# Patient Record
Sex: MALE | Race: WHITE | Employment: PART TIME | URBAN - METROPOLITAN AREA
[De-identification: names, ages, dates, MRNs, and addresses within clinical notes are randomized per-mention and may not be internally consistent; named-entity substitution may affect disease eponyms.]

---

## 2023-09-19 ENCOUNTER — OFFICE VISIT (OUTPATIENT)
Dept: URGENT CARE | Facility: URGENT CARE | Age: 28
End: 2023-09-19
Payer: COMMERCIAL

## 2023-09-19 VITALS
DIASTOLIC BLOOD PRESSURE: 82 MMHG | BODY MASS INDEX: 22.44 KG/M2 | WEIGHT: 143 LBS | TEMPERATURE: 97.7 F | HEIGHT: 67 IN | SYSTOLIC BLOOD PRESSURE: 148 MMHG | HEART RATE: 105 BPM | OXYGEN SATURATION: 100 %

## 2023-09-19 DIAGNOSIS — N48.89 PENILE PAIN: Primary | ICD-10-CM

## 2023-09-19 DIAGNOSIS — R21 RASH OF PENIS: ICD-10-CM

## 2023-09-19 LAB
ALBUMIN UR-MCNC: NEGATIVE MG/DL
APPEARANCE UR: CLEAR
BILIRUB UR QL STRIP: NEGATIVE
COLOR UR AUTO: YELLOW
GLUCOSE UR STRIP-MCNC: NEGATIVE MG/DL
HGB UR QL STRIP: NEGATIVE
KETONES UR STRIP-MCNC: NEGATIVE MG/DL
LEUKOCYTE ESTERASE UR QL STRIP: NEGATIVE
NITRATE UR QL: NEGATIVE
PH UR STRIP: 6.5 [PH] (ref 5–7)
SP GR UR STRIP: 1.01 (ref 1–1.03)
UROBILINOGEN UR STRIP-ACNC: 0.2 E.U./DL

## 2023-09-19 PROCEDURE — 81003 URINALYSIS AUTO W/O SCOPE: CPT | Performed by: FAMILY MEDICINE

## 2023-09-19 PROCEDURE — 99203 OFFICE O/P NEW LOW 30 MIN: CPT | Performed by: FAMILY MEDICINE

## 2023-09-19 RX ORDER — CLOTRIMAZOLE 1 %
CREAM (GRAM) TOPICAL 2 TIMES DAILY
Qty: 15 G | Refills: 0 | Status: SHIPPED | OUTPATIENT
Start: 2023-09-19

## 2023-09-20 NOTE — PROGRESS NOTES
"Assessment & Plan     Penile pain  Rash of penis   Possibly yeast v friction v contact derm. Might be worsened due to tighter frenulum inferiorly  - clotrimazole (LOTRIMIN) 1 % external cream  Dispense: 15 g; Refill: 0       [id number]}    Return in about 2 weeks (around 10/3/2023) for follow up with your regular clinic if needed.    Greg Enamorado MD  [facility]    ------------------------------------------------------------------------  Subjective     Edilson Lamb presents to clinic today for the following health issues:  chief complaint  HPI  This summer he has had some irritation on the ventral glans of the penis. It improved then recured about a week ago. Small amount of white dbris noted differe tn than his usual smegma. No sti risk ad not sexually active.       Review of Systems        Objective    BP (!) 148/82   Pulse 105   Temp 97.7  F (36.5  C) (Temporal)   Ht 1.689 m (5' 6.5\")   Wt 64.9 kg (143 lb)   SpO2 100%   BMI 22.74 kg/m    Physical Exam   GENERAL: healthy, alert and no distress   (male): uncircumcised male phallus with mild irritation ventrally. The ventral frenulum of the penis seems abnormally tight. No discharge noted.   "

## 2023-09-20 NOTE — PATIENT INSTRUCTIONS
If the antiyeast medication woods snot work then you could try over the counter hydrocortisone ointment/cream    If neither work then follow up with your doctor.

## 2024-01-23 ENCOUNTER — OFFICE VISIT (OUTPATIENT)
Dept: URGENT CARE | Facility: URGENT CARE | Age: 29
End: 2024-01-23
Payer: COMMERCIAL

## 2024-01-23 VITALS
TEMPERATURE: 98.2 F | HEART RATE: 112 BPM | HEIGHT: 67 IN | OXYGEN SATURATION: 99 % | DIASTOLIC BLOOD PRESSURE: 83 MMHG | SYSTOLIC BLOOD PRESSURE: 142 MMHG | WEIGHT: 145 LBS | BODY MASS INDEX: 22.76 KG/M2

## 2024-01-23 DIAGNOSIS — N48.89 IRRITATION OF PENIS: Primary | ICD-10-CM

## 2024-01-23 PROCEDURE — 99213 OFFICE O/P EST LOW 20 MIN: CPT | Performed by: PHYSICIAN ASSISTANT

## 2024-01-24 NOTE — PROGRESS NOTES
Irritation of penis  - Adult Urology  Referral; Future    Patient was thought to have fungal balanitis when last seen for this issue and was treated with clotrimazole which did not seem to improve his symptoms.  Today's examination did not seem suspicious for a fungal etiology and I am skeptical as to whether trying a different antifungal would be beneficial.  The patient was not consistently using hydrocortisone for this issue but I would like him to do so for 10 days and monitor for improvement.  I would like him to follow-up with urology for further workup if his irritation does not improve in that time.    Armin Henriquez PA-C  [facility]    Subjective   28 year old who presents to clinic today for the following health issues:    Urgent Care and Derm Problem       HPI     Genitourinary - Male  Onset/Duration: Patient has had ongoing recurring rash and hard white substance on the tip of his penis and around the foreskin- He was given an RX of clotrimazole and asked to use this together with hydrocortisone- Patient did not use hydrocortisone and he used the clotrimazole for 1 week- it helped slightly but not completely.  He states that it mitchell be irritated and tender at times. He was seen for this last in Sept   Description:   Dysuria (painful urination): YES  Hematuria (blood in urine): No  Frequency: No  Waking at night to urinate: No  Hesitancy (delay in urine): No  Retention (unable to empty): No  Decrease in urinary flow: No  Incontinence: No  Progression of Symptoms:  same  Accompanying Signs & Symptoms:  Fever: No  Back/Flank pain: No  Urethral discharge: No  Testicle lumps/masses/pain: No  Nausea and/or vomiting: No  Abdominal pain: No  History:   History of frequent UTI s: No  History of kidney stones: No  History of hernias: No  Personal or Family history of Prostate problems: No  Sexually active: No- No concern for STDs  Precipitating or alleviating factors: None  Therapies  tried and outcome: none    Review of Systems   Review of Systems   See HPI    Objective    Temp: 98.2  F (36.8  C) Temp src: Temporal BP: (!) 142/83 Pulse: 112     SpO2: 99 %       Physical Exam   Physical Exam  Constitutional:       General: He is not in acute distress.     Appearance: Normal appearance. He is normal weight. He is not ill-appearing, toxic-appearing or diaphoretic.   HENT:      Head: Normocephalic and atraumatic.   Cardiovascular:      Rate and Rhythm: Normal rate.      Pulses: Normal pulses.   Pulmonary:      Effort: Pulmonary effort is normal. No respiratory distress.   Genitourinary:     Comments: On the posterior aspect of the base of the head there appears to be a hypopigmented thickened segment of skin with slight surrounding erythema.  It does not seem to be tender to touch at this time.  There is no purulent discharge and no flaking noted.  The rest of the genital exam is within normal limits.  Neurological:      General: No focal deficit present.      Mental Status: He is alert and oriented to person, place, and time. Mental status is at baseline.      Gait: Gait normal.   Psychiatric:         Mood and Affect: Mood normal.         Behavior: Behavior normal.         Thought Content: Thought content normal.         Judgment: Judgment normal.          No results found for this or any previous visit (from the past 24 hour(s)).

## 2024-01-25 NOTE — TELEPHONE ENCOUNTER
MEDICAL RECORDS REQUEST   [facility]  Urology Clinic  [address] [place] [address]  PHONE: [phone]  Fax: [phone]        FUTURE VISIT INFORMATION                                                   Edilson T Zainab, : 1995 scheduled for future visit at [facility]    APPOINTMENT INFORMATION:  Date: 2024  Provider:  Aleisha Patel PA-C  Reason for Visit/Diagnosis: Irritation of penis     REFERRAL INFORMATION:  Referring provider:  Armin Henriquez PA-C in [facility]      RECORDS REQUESTED FOR VISIT                                                     NOTES  STATUS/DETAILS   OFFICE NOTE from referring provider  yes, 2024 -- Armin Henriquez PA-C in [facility]   OFFICE NOTE from other specialist  yes, 2023 -- Greg Enamorado MD @ [facility]   MEDICATION LIST  yes   LABS     URINALYSIS (UA)  yes     PRE-VISIT CHECKLIST      Joint diagnostic appointment coordinated correctly          (ensure right order & amount of time) Yes   RECORD COLLECTION COMPLETE Yes

## 2024-01-29 ENCOUNTER — PRE VISIT (OUTPATIENT)
Dept: UROLOGY | Facility: CLINIC | Age: 29
End: 2024-01-29
Payer: COMMERCIAL

## 2024-01-30 ENCOUNTER — PRE VISIT (OUTPATIENT)
Dept: UROLOGY | Facility: CLINIC | Age: 29
End: 2024-01-30

## 2024-01-30 ENCOUNTER — OFFICE VISIT (OUTPATIENT)
Dept: UROLOGY | Facility: CLINIC | Age: 29
End: 2024-01-30
Attending: PHYSICIAN ASSISTANT
Payer: COMMERCIAL

## 2024-01-30 VITALS
DIASTOLIC BLOOD PRESSURE: 114 MMHG | BODY MASS INDEX: 22.76 KG/M2 | SYSTOLIC BLOOD PRESSURE: 153 MMHG | WEIGHT: 145 LBS | HEIGHT: 67 IN | HEART RATE: 95 BPM

## 2024-01-30 DIAGNOSIS — N48.89 IRRITATION OF PENIS: ICD-10-CM

## 2024-01-30 DIAGNOSIS — N48.29: Primary | ICD-10-CM

## 2024-01-30 PROCEDURE — 99214 OFFICE O/P EST MOD 30 MIN: CPT | Performed by: PHYSICIAN ASSISTANT

## 2024-01-30 RX ORDER — HYDROCORTISONE 10 MG/G
CREAM TOPICAL 2 TIMES DAILY PRN
COMMUNITY

## 2024-01-30 RX ORDER — CLOBETASOL PROPIONATE 0.5 MG/G
CREAM TOPICAL 2 TIMES DAILY
Qty: 30 G | Refills: 0 | Status: SHIPPED | OUTPATIENT
Start: 2024-01-30

## 2024-01-30 ASSESSMENT — PAIN SCALES - GENERAL: PAINLEVEL: NO PAIN (0)

## 2024-01-30 NOTE — PROGRESS NOTES
Name: Edilson Lamb    MRN: [id number]   YOB: 1995                 Chief Complaint:   Penile irritation         Assessment and Plan:   28 year old male with penile irritation. On exam, foreskin is easily retractable but the frenulum is tight and mildly erythematous. There is also a small area of hypopigmented, thickened skin adjacent to the frenulum with possible slight extension of the hypopigmentation onto the glans near the meatus. We discussed possible etiologies and treatment options. Will plan for the following:   -Clobetasol BID x 2 weeks with gentle stretching of the skin in the affected area. Then a week off. Repeat if symptoms persist.  -If no improvement with topical treatment/stretches, follow up with Dr. Dobbins to possibly discuss procedural intervention (circumcision).    Aleisha Patel PA-C  January 30, 2024          History of Present Illness:   Edilson Lamb is a 28 year old male seen in consultation from Armin Henriquez PA-C for evaluation of penile irritation. For the last ~6 months, he has noticed more irritation and redness on the underside of his penis, near the head. Wonders if it resulted from frequent masturbation. He is uncircumcised and is able to retract the foreskin without issue. He will have pain in this area that comes and goes, but the redness remains. No pain today. There is a white area on the skin near the frenulum that is slightly firm but no drainage or bleeding. He has been seen twice in primary care for these symptoms. Tried clotrimazole in September 2023 with no change. This last week has been applying hydrocortisone which does seem to be helping slightly.     He denies any dysuria, gross hematuria, or voiding symptoms.    No concern for STIs. Not recently sexually active. Last relationship ~8 months ago was monogamous.          Past Medical History:   No past medical history on file.         Past Surgical History:   No past surgical history on file.      "    Social History:     Social History     Tobacco Use    Smoking status: Never    Smokeless tobacco: Never   Substance Use Topics    Alcohol use: Not on file            Family History:   No family history on file.         Allergies:   No Known Allergies         Medications:     Current Outpatient Medications   Medication Sig    hydrocortisone 1 % CREA cream Place rectally 2 times daily as needed for itching    clotrimazole (LOTRIMIN) 1 % external cream Apply topically 2 times daily (Patient not taking: Reported on 1/23/2024)     No current facility-administered medications for this visit.            Physical Exam:   BP (!) 174/77   Pulse 95   Ht 1.689 m (5' 6.5\")   Wt 65.8 kg (145 lb)   BMI 23.05 kg/m    GENERAL: alert and no distress  EYES: Eyes grossly normal to inspection.  No discharge or erythema, or obvious scleral/conjunctival abnormalities.  RESP: No audible wheeze, cough, or visible cyanosis.    : uncircumcised phallus. Foreskin retracts easily. Tight penile frenulum with some mild erythema but no fluctuance or tenderness to palpation. Small area of hypopigmented, thickened skin adjacent to the frenulum with possible slight extension of the hypopigmentation onto the glans near the meatus. Urethral meatus is orthotopic and patent without discharge.   SKIN: Visible skin clear. No significant rash, abnormal pigmentation or lesions.  NEURO: Cranial nerves grossly intact.  Mentation and speech appropriate for age.  PSYCH: Appropriate affect, tone, and pace of words      Labs:      Color Urine (no units)   Date Value   09/19/2023 Yellow     Appearance Urine (no units)   Date Value   09/19/2023 Clear     Glucose Urine (mg/dL)   Date Value   09/19/2023 Negative     Bilirubin Urine (no units)   Date Value   09/19/2023 Negative     Ketones Urine (mg/dL)   Date Value   09/19/2023 Negative     Specific Gravity Urine (no units)   Date Value   09/19/2023 1.010     pH Urine (no units)   Date Value   09/19/2023 6.5 "     Protein Albumin Urine (mg/dL)   Date Value   09/19/2023 Negative     Urobilinogen Urine (E.U./dL)   Date Value   09/19/2023 0.2     Nitrite Urine (no units)   Date Value   09/19/2023 Negative     Leukocyte Esterase Urine (no units)   Date Value   09/19/2023 Negative       Imaging:    None       30 minutes spent on the date of the encounter doing chart review, review of test results, patient visit, and documentation

## 2024-01-30 NOTE — PATIENT INSTRUCTIONS
UROLOGY CLINIC VISIT PATIENT INSTRUCTIONS    Apply clobetasol cream to the inflamed part of the penis twice daily:  -Pull the foreskin back.  -Apply the cream and rub in.   -Perform gentle stretching of the tight area after applying the cream.  -Put the foreskin back over the head of the penis.    Do this for 2 weeks. Then take a week off without using the cream (you can continue to perform the gentle stretching exercises). If symptoms persist, apply cream for another 2 weeks.    If issues persist after the above, then follow up with Dr. Onur Dobbins to discuss additional treatment options.     If you have any issues, questions or concerns in the meantime, do not hesitate to contact us at [phone] or via [employer].     It was a pleasure meeting with you today.  Thank you for allowing me and my team the privilege of caring for you today.  YOU are the reason we are here, and I truly hope we provided you with the excellent service you deserve.  Please let us know if there is anything else we can do for you so that we can be sure you are leaving completely satisfied with your care experience.

## 2024-01-30 NOTE — LETTER
1/30/2024       RE: Edilson Lamb  [address]   [address]  [facility] [address]     Dear Colleague,    Thank you for referring your patient, Edilson Lamb, to the [facility] [place] at [facility]. Please see a copy of my visit note below.      Name: Edilson Lamb    MRN: [id number]   YOB: 1995                 Chief Complaint:   Penile irritation         Assessment and Plan:   28 year old male with penile irritation. On exam, foreskin is easily retractable but the frenulum is tight and mildly erythematous. There is also a small area of hypopigmented, thickened skin adjacent to the frenulum with possible slight extension of the hypopigmentation onto the glans near the meatus. We discussed possible etiologies and treatment options. Will plan for the following:   -Clobetasol BID x 2 weeks with gentle stretching of the skin in the affected area. Then a week off. Repeat if symptoms persist.  -If no improvement with topical treatment/stretches, follow up with Dr. Dobbins to possibly discuss procedural intervention (circumcision).    Aleisha Patel PA-C  January 30, 2024          History of Present Illness:   Edilson Lamb is a 28 year old male seen in consultation from Armin Henriquez PA-C for evaluation of penile irritation. For the last ~6 months, he has noticed more irritation and redness on the underside of his penis, near the head. Wonders if it resulted from frequent masturbation. He is uncircumcised and is able to retract the foreskin without issue. He will have pain in this area that comes and goes, but the redness remains. No pain today. There is a white area on the skin near the frenulum that is slightly firm but no drainage or bleeding. He has been seen twice in primary care for these symptoms. Tried clotrimazole in September 2023 with no change. This last week has been applying hydrocortisone which does seem to be  "helping slightly.     He denies any dysuria, gross hematuria, or voiding symptoms.    No concern for STIs. Not recently sexually active. Last relationship ~8 months ago was monogamous.          Past Medical History:   No past medical history on file.         Past Surgical History:   No past surgical history on file.         Social History:     Social History     Tobacco Use    Smoking status: Never    Smokeless tobacco: Never   Substance Use Topics    Alcohol use: Not on file            Family History:   No family history on file.         Allergies:   No Known Allergies         Medications:     Current Outpatient Medications   Medication Sig    hydrocortisone 1 % CREA cream Place rectally 2 times daily as needed for itching    clotrimazole (LOTRIMIN) 1 % external cream Apply topically 2 times daily (Patient not taking: Reported on 1/23/2024)     No current facility-administered medications for this visit.            Physical Exam:   BP (!) 174/77   Pulse 95   Ht 1.689 m (5' 6.5\")   Wt 65.8 kg (145 lb)   BMI 23.05 kg/m    GENERAL: alert and no distress  EYES: Eyes grossly normal to inspection.  No discharge or erythema, or obvious scleral/conjunctival abnormalities.  RESP: No audible wheeze, cough, or visible cyanosis.    : uncircumcised phallus. Foreskin retracts easily. Tight penile frenulum with some mild erythema but no fluctuance or tenderness to palpation. Small area of hypopigmented, thickened skin adjacent to the frenulum with possible slight extension of the hypopigmentation onto the glans near the meatus. Urethral meatus is orthotopic and patent without discharge.   SKIN: Visible skin clear. No significant rash, abnormal pigmentation or lesions.  NEURO: Cranial nerves grossly intact.  Mentation and speech appropriate for age.  PSYCH: Appropriate affect, tone, and pace of words      Labs:      Color Urine (no units)   Date Value   09/19/2023 Yellow     Appearance Urine (no units)   Date Value "   09/19/2023 Clear     Glucose Urine (mg/dL)   Date Value   09/19/2023 Negative     Bilirubin Urine (no units)   Date Value   09/19/2023 Negative     Ketones Urine (mg/dL)   Date Value   09/19/2023 Negative     Specific Gravity Urine (no units)   Date Value   09/19/2023 1.010     pH Urine (no units)   Date Value   09/19/2023 6.5     Protein Albumin Urine (mg/dL)   Date Value   09/19/2023 Negative     Urobilinogen Urine (E.U./dL)   Date Value   09/19/2023 0.2     Nitrite Urine (no units)   Date Value   09/19/2023 Negative     Leukocyte Esterase Urine (no units)   Date Value   09/19/2023 Negative       Imaging:    None       30 minutes spent on the date of the encounter doing chart review, review of test results, patient visit, and documentation      Aleisha Patel PA-C